# Patient Record
(demographics unavailable — no encounter records)

---

## 2025-06-25 NOTE — HISTORY OF PRESENT ILLNESS
[FreeTextEntry1] : Masood Hamlin is a 58 year old female with PMH of RUI, obesity, preDM, HLD, HTN, pituitary microadenoma, migraines and lumbar radiculopathy who presents for initial neurological consultation.  Patient reports having a recent MRI in 4/2025 and has not been able to get in touch with her previous neurologist since then. MRI at [facility] from 4/15/25 with 3 mm x 4 mm hypoenhancing focus in the mid/left paramedian aspect of the pituitary at the junction of the adenohyphosis and neurohypophysis. 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 microadenoma vs. normal vascular structure. Suboptimal comparison due to lack of intravenous contrast with 2099 at the absence of images from 2008. No intracranial hemorrhage, mass effect or extra-axial collection. A few scattered punctate foci of T2 FLAIR hyperintensity within the supratentorial white matter. Stable to mild progression. She reports no visual changes or worsening headaches. She reports her last pituitary lab work was with Endocrine in 2023.   She reports chronic balance issues related to her known lumbar stenosis and hip issues. She reports decreased sensation in both of her legs at baseline. She reports chronic migraines with photophobia, phonophobia and feeling like her brain is filling up which now occur infrequently. She takes Topiramate 50 mg daily at bedtime and Sumatriptan nasal spray PRN (last use 3 months ago). She also reports mild left sided mild sharp pain headaches about once per month that only last a few seconds. BP today 123/86. She reports no family history of neurological issues including migraines. She is never smoker and drinks socially on the weekends.   Current Medications: Losartan 25 mg daily  Atorvastatin 40 mg daily Gabapentin 300 mg daily Clonazepam 0.5 mg daily Topiramate 50 mg daily

## 2025-06-25 NOTE — ASSESSMENT
[FreeTextEntry1] : Masood Hamlin is a 58 year old female with PMH of RUI, obesity, preDM, HLD, HTN, pituitary microadenoma, migraines and lumbar radiculopathy who presents for initial neurological consultation.  Plan: -Likely will repeat MRI head w/wo in 2 years for microadenoma surveillance; referral to NSGY for further management -Continue annual f/u with Endocrine for pituitary labs -Continue Topiramate 50 mg daily at bedtime for migraine prevention -Continue Sumatriptan nasal spray PRN for migraine rescue -Encouraged healthy lifestyle habits including regular exercise, goal of 8 hours of sleep, adequate hydration and stress management -RTO PRN or if migraines worsen

## 2025-06-25 NOTE — PHYSICAL EXAM
[FreeTextEntry1] : Alert. Fully oriented. Speech and language are intact. Cranial nerves II-XII are intact. Motor exam reveals intact strength with individual muscle testing in bilateral upper and lower extremities. No drift. Tone is normal. Antalgic gait.

## 2025-06-25 NOTE — HISTORY OF PRESENT ILLNESS
[FreeTextEntry1] : Masood Hamlin is a 58 year old female with PMH of RUI, obesity, preDM, HLD, HTN, pituitary microadenoma, migraines and lumbar radiculopathy who presents for initial neurological consultation.  Patient reports having a recent MRI in 4/2025 and has not been able to get in touch with her previous neurologist since then. MRI at [facility] from 4/15/25 with 3 mm x 4 mm hypoenhancing focus in the mid/left paramedian aspect of the pituitary at the junction of the adenohyphosis and neurohypophysis. 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 microadenoma vs. normal vascular structure. Suboptimal comparison due to lack of intravenous contrast with 2099 at the absence of images from 2008. No intracranial hemorrhage, mass effect or extra-axial collection. A few scattered punctate foci of T2 FLAIR hyperintensity within the supratentorial white matter. Stable to mild progression. She reports no visual changes or worsening headaches. She reports her last pituitary lab work was with Endocrine in 2023.   She reports chronic balance issues related to her known lumbar stenosis and hip issues. She reports decreased sensation in both of her legs at baseline. She reports chronic migraines with photophobia, phonophobia and feeling like her brain is filling up which now occur infrequently. She takes Topiramate 50 mg daily at bedtime and Sumatriptan nasal spray PRN (last use 3 months ago). She also reports mild left sided mild sharp pain headaches about once per month that only last a few seconds. BP today 123/86. She reports no family history of neurological issues including migraines. She is never smoker and drinks socially on the weekends.   Current Medications: Losartan 25 mg daily  Atorvastatin 40 mg daily Gabapentin 300 mg daily Clonazepam 0.5 mg daily Topiramate 50 mg daily

## 2025-07-01 NOTE — HISTORY OF PRESENT ILLNESS
[de-identified] : 59 y/o female with PMHx of RUI, preDM, HTN, HLD, pituitary microadenoma dx in 2008 during workup for heavy menses (was never on bromocriptine/cabergoline), who presents today to establish neurosurgical care and follow- up.   Presents TODAY for eval.  Labs have been without gross pituitary dysfunction.  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  Neurology: Maylin Ennis (referring physician) [facility]

## 2025-07-01 NOTE — DATA REVIEWED
[de-identified] : Exam requested by: JASWINDER VILLAGOMEZ  [place], [address] [place] [address] SITE PERFORMED: [facility] SITE PHONE: [phone] Patient: PARUL COBOS YOB: 1967 Phone: [phone] MRN: [id number] Acc: [id number] Date of Exam: 04-   EXAM:  MRI BRAIN AND PITUITARY WITHOUT AND WITH CONTRAST  HISTORY:  Balance issues, right upper extremity weakness, history of pituitary cysts  TECHNIQUE: Thin section high resolution imaging of the pituitary gland was performed at 3T with T1 and T2-weighted sagittal and coronal images before gadolinium and coronal and sagittal T1-weighted images after gadolinium. FLAIR axial images were also performed through the brain.  IV Contrast:  20 ml of Clariscan was injected from a 20 ml single use vial.  COMPARISON:  MR pituitary report 2008 and 2009  FINDINGS:  3 mm x 4 mm hypoenhancing focus in the mid/left paramedian aspect of the pituitary at the junction of the adenohypophysis and neurohypophysis. 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 Suboptimal comparison to prior due to lack of intravenous contrast with 2009 at the absence of images from 2008. Comparison with prior imaging is recommended if available. Outside imaging can be uploaded to the PACS system for direct comparison.   No significant change in ventricular size or morphology. No definite focal lesion. Infundibulum is normal in size and midline. Optic chiasm is normal. No suprasellar or parasellar masses. Cavernous sinuses appear symmetric.   Right paramedian anterior gangliocapsular cyst versus prominent perivascular space, not significantly change compared to 2015. No enhancement.  The ventricles are normal in size for age and midline.  A few scattered punctate foci of T2 FLAIR hyperintensity within the supratentorial white matter. Stable to mild progression.  Findings are nonspecific and may be sequela of chronic microvascular ischemia, demyelination, infection, inflammation, vasculitis and migraine headaches among other etiologies.   The overlying cortical sulci are normal. There is no evidence of acute hemorrhage, mass-effect or extra-axial collection.  Flow-voids are identified in the internal carotid and basilar arteries consistent with their patency.  IMPRESSION:  1.  3 mm x 4 mm hypoenhancing focus in the mid/left paramedian aspect of the pituitary at the junction of the adenohypophysis and neurohypophysis. 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 Suboptimal comparison to prior due to lack of intravenous contrast with 2009 at the absence of images from 2008. Comparison with prior imaging is recommended if available. Outside imaging can be uploaded to the PACS system for direct comparison.  2.  No intracranial hemorrhage, mass effect or extra-axial collection.   3.   A few scattered punctate foci of T2 FLAIR hyperintensity within the supratentorial white matter. Stable to mild progression.  Findings are nonspecific and may be sequela of chronic microvascular ischemia, demyelination, infection, inflammation, vasculitis and migraine headaches among other etiologies.      Thank you for the opportunity to participate in the care of this patient.     WINDY KING MD  - Electronically Signed: 04- 12:40 PM  Physician to Physician Direct Line is: [phone]

## 2025-07-02 NOTE — HISTORY OF PRESENT ILLNESS
[de-identified] : 59 y/o female with PMHx of RUI, preDM, HTN, HLD, pituitary microadenoma dx in 2008 during workup for heavy menses (was never on bromocriptine/cabergoline), who presents today to establish neurosurgical care and follow- up.   Presents TODAY for eval.  Labs have been without gross pituitary dysfunction.  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  Neurology: Maylin Ennis (referring physician) [facility]

## 2025-07-02 NOTE — ASSESSMENT
[FreeTextEntry1] : Dr. D'Amico independently reviewed all available images with patient.   PLAN:   Patient verbalizes understanding of today's discussion and next steps in treatment plan.   Today, my ACP, _____, was here to observe my evaluation and management services for this patient to be followed going forward.

## 2025-07-02 NOTE — DATA REVIEWED
[de-identified] : Exam requested by: JASWINDER VILLGAOMEZ  [place], [address] [place] [address] SITE PERFORMED: [facility] SITE PHONE: [phone] Patient: PARUL COBOS YOB: 1967 Phone: [phone] MRN: [id number] Acc: [id number] Date of Exam: 04-   EXAM:  MRI BRAIN AND PITUITARY WITHOUT AND WITH CONTRAST  HISTORY:  Balance issues, right upper extremity weakness, history of pituitary cysts  TECHNIQUE: Thin section high resolution imaging of the pituitary gland was performed at 3T with T1 and T2-weighted sagittal and coronal images before gadolinium and coronal and sagittal T1-weighted images after gadolinium. FLAIR axial images were also performed through the brain.  IV Contrast:  20 ml of Clariscan was injected from a 20 ml single use vial.  COMPARISON:  MR pituitary report 2008 and 2009  FINDINGS:  3 mm x 4 mm hypoenhancing focus in the mid/left paramedian aspect of the pituitary at the junction of the adenohypophysis and neurohypophysis. 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 Suboptimal comparison to prior due to lack of intravenous contrast with 2009 at the absence of images from 2008. Comparison with prior imaging is recommended if available. Outside imaging can be uploaded to the PACS system for direct comparison.   No significant change in ventricular size or morphology. No definite focal lesion. Infundibulum is normal in size and midline. Optic chiasm is normal. No suprasellar or parasellar masses. Cavernous sinuses appear symmetric.   Right paramedian anterior gangliocapsular cyst versus prominent perivascular space, not significantly change compared to 2015. No enhancement.  The ventricles are normal in size for age and midline.  A few scattered punctate foci of T2 FLAIR hyperintensity within the supratentorial white matter. Stable to mild progression.  Findings are nonspecific and may be sequela of chronic microvascular ischemia, demyelination, infection, inflammation, vasculitis and migraine headaches among other etiologies.   The overlying cortical sulci are normal. There is no evidence of acute hemorrhage, mass-effect or extra-axial collection.  Flow-voids are identified in the internal carotid and basilar arteries consistent with their patency.  IMPRESSION:  1.  3 mm x 4 mm hypoenhancing focus in the mid/left paramedian aspect of the pituitary at the junction of the adenohypophysis and neurohypophysis. 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 Suboptimal comparison to prior due to lack of intravenous contrast with 2009 at the absence of images from 2008. Comparison with prior imaging is recommended if available. Outside imaging can be uploaded to the PACS system for direct comparison.  2.  No intracranial hemorrhage, mass effect or extra-axial collection.   3.   A few scattered punctate foci of T2 FLAIR hyperintensity within the supratentorial white matter. Stable to mild progression.  Findings are nonspecific and may be sequela of chronic microvascular ischemia, demyelination, infection, inflammation, vasculitis and migraine headaches among other etiologies.      Thank you for the opportunity to participate in the care of this patient.     WINDY KING MD  - Electronically Signed: 04- 12:40 PM  Physician to Physician Direct Line is: [phone]

## 2025-07-12 NOTE — ASSESSMENT
[FreeTextEntry1] : Dr. D'Amico independently reviewed all available images with patient.   PLAN: -    Patient verbalizes understanding of today's discussion and next steps in treatment plan.   Today, my ACP, _____, was here to observe my evaluation and management services for this patient to be followed going forward.

## 2025-07-12 NOTE — DATA REVIEWED
[de-identified] : Exam requested by: JASWINDER VILLAGOMEZ  [place], [address] [place] [address] SITE PERFORMED: [facility] SITE PHONE: [phone] Patient: PARUL COBOS YOB: 1967 Phone: [phone] MRN: [id number] Acc: [id number] Date of Exam: 04-   EXAM:  MRI BRAIN AND PITUITARY WITHOUT AND WITH CONTRAST  HISTORY:  Balance issues, right upper extremity weakness, history of pituitary cysts  TECHNIQUE: Thin section high resolution imaging of the pituitary gland was performed at 3T with T1 and T2-weighted sagittal and coronal images before gadolinium and coronal and sagittal T1-weighted images after gadolinium. FLAIR axial images were also performed through the brain.  IV Contrast:  20 ml of Clariscan was injected from a 20 ml single use vial.  COMPARISON:  MR pituitary report 2008 and 2009  FINDINGS:  3 mm x 4 mm hypoenhancing focus in the mid/left paramedian aspect of the pituitary at the junction of the adenohypophysis and neurohypophysis. 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 Suboptimal comparison to prior due to lack of intravenous contrast with 2009 at the absence of images from 2008. Comparison with prior imaging is recommended if available. Outside imaging can be uploaded to the PACS system for direct comparison.   No significant change in ventricular size or morphology. No definite focal lesion. Infundibulum is normal in size and midline. Optic chiasm is normal. No suprasellar or parasellar masses. Cavernous sinuses appear symmetric.   Right paramedian anterior gangliocapsular cyst versus prominent perivascular space, not significantly change compared to 2015. No enhancement.  The ventricles are normal in size for age and midline.  A few scattered punctate foci of T2 FLAIR hyperintensity within the supratentorial white matter. Stable to mild progression.  Findings are nonspecific and may be sequela of chronic microvascular ischemia, demyelination, infection, inflammation, vasculitis and migraine headaches among other etiologies.   The overlying cortical sulci are normal. There is no evidence of acute hemorrhage, mass-effect or extra-axial collection.  Flow-voids are identified in the internal carotid and basilar arteries consistent with their patency.  IMPRESSION:  1.  3 mm x 4 mm hypoenhancing focus in the mid/left paramedian aspect of the pituitary at the junction of the adenohypophysis and neurohypophysis. 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 Suboptimal comparison to prior due to lack of intravenous contrast with 2009 at the absence of images from 2008. Comparison with prior imaging is recommended if available. Outside imaging can be uploaded to the PACS system for direct comparison.  2.  No intracranial hemorrhage, mass effect or extra-axial collection.   3.   A few scattered punctate foci of T2 FLAIR hyperintensity within the supratentorial white matter. Stable to mild progression.  Findings are nonspecific and may be sequela of chronic microvascular ischemia, demyelination, infection, inflammation, vasculitis and migraine headaches among other etiologies.      Thank you for the opportunity to participate in the care of this patient.     WINDY KING MD  - Electronically Signed: 04- 12:40 PM  Physician to Physician Direct Line is: [phone]

## 2025-07-12 NOTE — HISTORY OF PRESENT ILLNESS
[de-identified] : 57 y/o female with PMHx of RUI, preDM, HTN, HLD, pituitary microadenoma dx in 2008 during workup for heavy menses (was never on bromocriptine/cabergoline), who presents today to establish neurosurgical care and follow- up.   Presents TODAY for eval.  Labs have been without gross pituitary dysfunction.  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  Neurology: Maylin Ennis (referring) [facility]

## 2025-07-12 NOTE — HISTORY OF PRESENT ILLNESS
[de-identified] : 59 y/o female with PMHx of RUI, preDM, HTN, HLD, pituitary microadenoma dx in 2008 during workup for heavy menses (was never on bromocriptine/cabergoline), who presents today to establish neurosurgical care and follow- up.   Presents TODAY for eval.  Labs have been without gross pituitary dysfunction.  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  Neurology: Maylin Ennis (referring) Endocrine[facility]

## 2025-07-12 NOTE — DATA REVIEWED
[de-identified] : Exam requested by: JASWINDER VILLAGOMEZ  [place], [address] [place] [address] SITE PERFORMED: [facility] SITE PHONE: [phone] Patient: PARUL COBOS YOB: 1967 Phone: [phone] MRN: [id number] Acc: [id number] Date of Exam: 04-   EXAM:  MRI BRAIN AND PITUITARY WITHOUT AND WITH CONTRAST  HISTORY:  Balance issues, right upper extremity weakness, history of pituitary cysts  TECHNIQUE: Thin section high resolution imaging of the pituitary gland was performed at 3T with T1 and T2-weighted sagittal and coronal images before gadolinium and coronal and sagittal T1-weighted images after gadolinium. FLAIR axial images were also performed through the brain.  IV Contrast:  20 ml of Clariscan was injected from a 20 ml single use vial.  COMPARISON:  MR pituitary report 2008 and 2009  FINDINGS:  3 mm x 4 mm hypoenhancing focus in the mid/left paramedian aspect of the pituitary at the junction of the adenohypophysis and neurohypophysis. 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 Suboptimal comparison to prior due to lack of intravenous contrast with 2009 at the absence of images from 2008. Comparison with prior imaging is recommended if available. Outside imaging can be uploaded to the PACS system for direct comparison.   No significant change in ventricular size or morphology. No definite focal lesion. Infundibulum is normal in size and midline. Optic chiasm is normal. No suprasellar or parasellar masses. Cavernous sinuses appear symmetric.   Right paramedian anterior gangliocapsular cyst versus prominent perivascular space, not significantly change compared to 2015. No enhancement.  The ventricles are normal in size for age and midline.  A few scattered punctate foci of T2 FLAIR hyperintensity within the supratentorial white matter. Stable to mild progression.  Findings are nonspecific and may be sequela of chronic microvascular ischemia, demyelination, infection, inflammation, vasculitis and migraine headaches among other etiologies.   The overlying cortical sulci are normal. There is no evidence of acute hemorrhage, mass-effect or extra-axial collection.  Flow-voids are identified in the internal carotid and basilar arteries consistent with their patency.  IMPRESSION:  1.  3 mm x 4 mm hypoenhancing focus in the mid/left paramedian aspect of the pituitary at the junction of the adenohypophysis and neurohypophysis. 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 Suboptimal comparison to prior due to lack of intravenous contrast with 2009 at the absence of images from 2008. Comparison with prior imaging is recommended if available. Outside imaging can be uploaded to the PACS system for direct comparison.  2.  No intracranial hemorrhage, mass effect or extra-axial collection.   3.   A few scattered punctate foci of T2 FLAIR hyperintensity within the supratentorial white matter. Stable to mild progression.  Findings are nonspecific and may be sequela of chronic microvascular ischemia, demyelination, infection, inflammation, vasculitis and migraine headaches among other etiologies.      Thank you for the opportunity to participate in the care of this patient.     WINDY KING MD  - Electronically Signed: 04- 12:40 PM  Physician to Physician Direct Line is: [phone]

## 2025-07-20 NOTE — HISTORY OF PRESENT ILLNESS
[de-identified] : 59 y/o female with PMHx of RUI, preDM, HTN, HLD, pituitary microadenoma dx in 2008 during workup for heavy menses (was never on bromocriptine/cabergoline), who presents today to establish neurosurgical care and follow- up.   Presents TODAY for eval.  Labs have been without gross pituitary dysfunction.  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  Neurology: Maylin Ennis (referring) Endocrine[facility]

## 2025-07-20 NOTE — ASSESSMENT
[FreeTextEntry1] : Dr. D'Amico independently reviewed all available images with patient.   PLAN:   Patient verbalizes understanding of today's discussion and next steps in treatment plan.    Today, my ACP, Maria T Kim, was here to observe my evaluation and management services for this patient to be followed going forward.

## 2025-07-20 NOTE — DATA REVIEWED
[de-identified] : Exam requested by: JASWINDER VILLAGOMEZ  [place], [address] [place] [address] SITE PERFORMED: [facility] SITE PHONE: [phone] Patient: PARUL COBOS YOB: 1967 Phone: [phone] MRN: [id number] Acc: [id number] Date of Exam: 04-   EXAM:  MRI BRAIN AND PITUITARY WITHOUT AND WITH CONTRAST  HISTORY:  Balance issues, right upper extremity weakness, history of pituitary cysts  TECHNIQUE: Thin section high resolution imaging of the pituitary gland was performed at 3T with T1 and T2-weighted sagittal and coronal images before gadolinium and coronal and sagittal T1-weighted images after gadolinium. FLAIR axial images were also performed through the brain.  IV Contrast:  20 ml of Clariscan was injected from a 20 ml single use vial.  COMPARISON:  MR pituitary report 2008 and 2009  FINDINGS:  3 mm x 4 mm hypoenhancing focus in the mid/left paramedian aspect of the pituitary at the junction of the adenohypophysis and neurohypophysis. 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 Suboptimal comparison to prior due to lack of intravenous contrast with 2009 at the absence of images from 2008. Comparison with prior imaging is recommended if available. Outside imaging can be uploaded to the PACS system for direct comparison.   No significant change in ventricular size or morphology. No definite focal lesion. Infundibulum is normal in size and midline. Optic chiasm is normal. No suprasellar or parasellar masses. Cavernous sinuses appear symmetric.   Right paramedian anterior gangliocapsular cyst versus prominent perivascular space, not significantly change compared to 2015. No enhancement.  The ventricles are normal in size for age and midline.  A few scattered punctate foci of T2 FLAIR hyperintensity within the supratentorial white matter. Stable to mild progression.  Findings are nonspecific and may be sequela of chronic microvascular ischemia, demyelination, infection, inflammation, vasculitis and migraine headaches among other etiologies.   The overlying cortical sulci are normal. There is no evidence of acute hemorrhage, mass-effect or extra-axial collection.  Flow-voids are identified in the internal carotid and basilar arteries consistent with their patency.  IMPRESSION:  1.  3 mm x 4 mm hypoenhancing focus in the mid/left paramedian aspect of the pituitary at the junction of the adenohypophysis and neurohypophysis. 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 Suboptimal comparison to prior due to lack of intravenous contrast with 2009 at the absence of images from 2008. Comparison with prior imaging is recommended if available. Outside imaging can be uploaded to the PACS system for direct comparison.  2.  No intracranial hemorrhage, mass effect or extra-axial collection.   3.   A few scattered punctate foci of T2 FLAIR hyperintensity within the supratentorial white matter. Stable to mild progression.  Findings are nonspecific and may be sequela of chronic microvascular ischemia, demyelination, infection, inflammation, vasculitis and migraine headaches among other etiologies.      Thank you for the opportunity to participate in the care of this patient.     WINDY KING MD  - Electronically Signed: 04- 12:40 PM  Physician to Physician Direct Line is: [phone]

## 2025-07-22 NOTE — HISTORY OF PRESENT ILLNESS
[de-identified] : 57 y/o female with PMHx of RUI, preDM, HTN, HLD, pituitary microadenoma dx in 2008 during workup for heavy menses (was never on bromocriptine/cabergoline), who presents today to establish neurosurgical care and follow- up.   Presents TODAY for eval.  Labs have been without gross pituitary dysfunction.  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  Neurology: Maylin Ennis (referring) EndocrineL Tor Sena

## 2025-07-22 NOTE — DATA REVIEWED
[de-identified] : Exam requested by: JASWINDER VILLAGOMEZ  [place], [address] [place] [address] SITE PERFORMED: [facility] SITE PHONE: [phone] Patient: PARUL COBOS YOB: 1967 Phone: [phone] MRN: [id number] Acc: [id number] Date of Exam: 04-   EXAM:  MRI BRAIN AND PITUITARY WITHOUT AND WITH CONTRAST  HISTORY:  Balance issues, right upper extremity weakness, history of pituitary cysts  TECHNIQUE: Thin section high resolution imaging of the pituitary gland was performed at 3T with T1 and T2-weighted sagittal and coronal images before gadolinium and coronal and sagittal T1-weighted images after gadolinium. FLAIR axial images were also performed through the brain.  IV Contrast:  20 ml of Clariscan was injected from a 20 ml single use vial.  COMPARISON:  MR pituitary report 2008 and 2009  FINDINGS:  3 mm x 4 mm hypoenhancing focus in the mid/left paramedian aspect of the pituitary at the junction of the adenohypophysis and neurohypophysis. 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 Suboptimal comparison to prior due to lack of intravenous contrast with 2009 at the absence of images from 2008. Comparison with prior imaging is recommended if available. Outside imaging can be uploaded to the PACS system for direct comparison.   No significant change in ventricular size or morphology. No definite focal lesion. Infundibulum is normal in size and midline. Optic chiasm is normal. No suprasellar or parasellar masses. Cavernous sinuses appear symmetric.   Right paramedian anterior gangliocapsular cyst versus prominent perivascular space, not significantly change compared to 2015. No enhancement.  The ventricles are normal in size for age and midline.  A few scattered punctate foci of T2 FLAIR hyperintensity within the supratentorial white matter. Stable to mild progression.  Findings are nonspecific and may be sequela of chronic microvascular ischemia, demyelination, infection, inflammation, vasculitis and migraine headaches among other etiologies.   The overlying cortical sulci are normal. There is no evidence of acute hemorrhage, mass-effect or extra-axial collection.  Flow-voids are identified in the internal carotid and basilar arteries consistent with their patency.  IMPRESSION:  1.  3 mm x 4 mm hypoenhancing focus in the mid/left paramedian aspect of the pituitary at the junction of the adenohypophysis and neurohypophysis. 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 Suboptimal comparison to prior due to lack of intravenous contrast with 2009 at the absence of images from 2008. Comparison with prior imaging is recommended if available. Outside imaging can be uploaded to the PACS system for direct comparison.  2.  No intracranial hemorrhage, mass effect or extra-axial collection.   3.   A few scattered punctate foci of T2 FLAIR hyperintensity within the supratentorial white matter. Stable to mild progression.  Findings are nonspecific and may be sequela of chronic microvascular ischemia, demyelination, infection, inflammation, vasculitis and migraine headaches among other etiologies.      Thank you for the opportunity to participate in the care of this patient.     WINDY KING MD  - Electronically Signed: 04- 12:40 PM  Physician to Physician Direct Line is: [phone]

## 2025-07-24 NOTE — DATA REVIEWED
[de-identified] : Exam requested by: JASWINDER VILLAGOMEZ  [place], [address] [place] [address] SITE PERFORMED: [facility] SITE PHONE: [phone] Patient: PARUL COBOS YOB: 1967 Phone: [phone] MRN: [id number] Acc: [id number] Date of Exam: 04-   EXAM:  MRI BRAIN AND PITUITARY WITHOUT AND WITH CONTRAST  HISTORY:  Balance issues, right upper extremity weakness, history of pituitary cysts  TECHNIQUE: Thin section high resolution imaging of the pituitary gland was performed at 3T with T1 and T2-weighted sagittal and coronal images before gadolinium and coronal and sagittal T1-weighted images after gadolinium. FLAIR axial images were also performed through the brain.  IV Contrast:  20 ml of Clariscan was injected from a 20 ml single use vial.  COMPARISON:  MR pituitary report 2008 and 2009  FINDINGS:  3 mm x 4 mm hypoenhancing focus in the mid/left paramedian aspect of the pituitary at the junction of the adenohypophysis and neurohypophysis. 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 Suboptimal comparison to prior due to lack of intravenous contrast with 2009 at the absence of images from 2008. Comparison with prior imaging is recommended if available. Outside imaging can be uploaded to the PACS system for direct comparison.   No significant change in ventricular size or morphology. No definite focal lesion. Infundibulum is normal in size and midline. Optic chiasm is normal. No suprasellar or parasellar masses. Cavernous sinuses appear symmetric.   Right paramedian anterior gangliocapsular cyst versus prominent perivascular space, not significantly change compared to 2015. No enhancement.  The ventricles are normal in size for age and midline.  A few scattered punctate foci of T2 FLAIR hyperintensity within the supratentorial white matter. Stable to mild progression.  Findings are nonspecific and may be sequela of chronic microvascular ischemia, demyelination, infection, inflammation, vasculitis and migraine headaches among other etiologies.   The overlying cortical sulci are normal. There is no evidence of acute hemorrhage, mass-effect or extra-axial collection.  Flow-voids are identified in the internal carotid and basilar arteries consistent with their patency.  IMPRESSION:  1.  3 mm x 4 mm hypoenhancing focus in the mid/left paramedian aspect of the pituitary at the junction of the adenohypophysis and neurohypophysis. 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 Suboptimal comparison to prior due to lack of intravenous contrast with 2009 at the absence of images from 2008. Comparison with prior imaging is recommended if available. Outside imaging can be uploaded to the PACS system for direct comparison.  2.  No intracranial hemorrhage, mass effect or extra-axial collection.   3.   A few scattered punctate foci of T2 FLAIR hyperintensity within the supratentorial white matter. Stable to mild progression.  Findings are nonspecific and may be sequela of chronic microvascular ischemia, demyelination, infection, inflammation, vasculitis and migraine headaches among other etiologies.      Thank you for the opportunity to participate in the care of this patient.     WINDY KING MD  - Electronically Signed: 04- 12:40 PM  Physician to Physician Direct Line is: [phone]

## 2025-07-24 NOTE — REASON FOR VISIT
[Home] : at home, [unfilled] , at the time of the visit. [Medical Office: ([facility])___] : at the medical office located in  [Telephone (audio)] : This telephonic visit was provided via audio only technology. [Patient preference] : patient preference. [Verbal consent obtained from patient] : the patient, [unfilled] [FreeTextEntry1] : hx of pituitary microadenoma

## 2025-07-24 NOTE — HISTORY OF PRESENT ILLNESS
[de-identified] : 57 y/o female with PMHx of RUI, preDM, HTN, HLD, pituitary microadenoma dx in 2008 during workup for heavy menses (was never on bromocriptine/cabergoline), who presents today to establish neurosurgical care and follow- up.   Presents TODAY for eval.  Labs have been without gross pituitary dysfunction.  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 Prior to this most recent MRI, her last imaging from 2019- does not have records for today's visit.  She does note some balance issues.   Neurology: Maylin Ennis (referring) Endocrine: Tor Sena

## 2025-07-24 NOTE — ASSESSMENT
[FreeTextEntry1] : This 58-year-old female presented with a history of a non-functioning pituitary microadenoma, diagnosed in 2008 during a workup for heavy menses.  She has never been on bromocriptine or cabergoline. A recent MRI from April showed a hypo-enhancing focus in the mid-left median aspect of the pituitary, at the junction of the adenohypophysis and neurohypophysis. 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  She also has a history of generalized anxiety disorder, pre-diabetes, hypertension, and hypercholesterolemia.  The plan is to continue regular endocrinology follow-up, discuss the frequency of MRI scans for monitoring, review recent hormone levels, educate the patient on signs and symptoms to watch for, and schedule follow-up appointments as recommended by endocrinology.  Her other medical problems will also be addressed.  Dr. D'Amico independently reviewed all available images with patient.   PLAN: - Patient to try to obtain 2019 records - Continue imbalance workup with neurology  - Repeat MRI for follow- up in one year (April 2026); RTC after for review   Patient verbalizes understanding of today's discussion and next steps in treatment plan.    Today, my ACP, Maria T Kim, was here to observe my evaluation and management services for this patient to be followed going forward.      Patient appreciates and agrees with current plan. This note was generated using medical dictation software. A reasonable effort has been made for proofreading its contents, but typos may still remain. If there are any questions or points of clarification needed, please notify my office.  A total of 45 minutes was spent reviewing the labs, imaging and physical examination of the patient. We discussed the diagnosis, and the plan. The patient's questions were answered. The patient demonstrated an excellent understanding of the plan.